# Patient Record
Sex: FEMALE | Race: WHITE | Employment: OTHER | URBAN - METROPOLITAN AREA
[De-identification: names, ages, dates, MRNs, and addresses within clinical notes are randomized per-mention and may not be internally consistent; named-entity substitution may affect disease eponyms.]

---

## 2022-10-03 ENCOUNTER — OFFICE VISIT (OUTPATIENT)
Dept: INTERNAL MEDICINE | Age: 72
End: 2022-10-03
Payer: COMMERCIAL

## 2022-10-03 VITALS
BODY MASS INDEX: 44.42 KG/M2 | DIASTOLIC BLOOD PRESSURE: 82 MMHG | WEIGHT: 241.4 LBS | HEART RATE: 51 BPM | RESPIRATION RATE: 16 BRPM | HEIGHT: 62 IN | SYSTOLIC BLOOD PRESSURE: 127 MMHG | OXYGEN SATURATION: 95 %

## 2022-10-03 DIAGNOSIS — D69.6 THROMBOCYTOPENIA (HCC): ICD-10-CM

## 2022-10-03 DIAGNOSIS — N18.30 STAGE 3 CHRONIC KIDNEY DISEASE, UNSPECIFIED WHETHER STAGE 3A OR 3B CKD (HCC): ICD-10-CM

## 2022-10-03 DIAGNOSIS — E11.42 TYPE 2 DIABETES MELLITUS WITH DIABETIC POLYNEUROPATHY, WITHOUT LONG-TERM CURRENT USE OF INSULIN (HCC): ICD-10-CM

## 2022-10-03 DIAGNOSIS — E89.0 POST-SURGICAL HYPOTHYROIDISM: ICD-10-CM

## 2022-10-03 DIAGNOSIS — I10 ESSENTIAL HYPERTENSION: ICD-10-CM

## 2022-10-03 DIAGNOSIS — Z11.59 NEED FOR HEPATITIS C SCREENING TEST: ICD-10-CM

## 2022-10-03 DIAGNOSIS — E11.42 TYPE 2 DIABETES MELLITUS WITH DIABETIC POLYNEUROPATHY, WITHOUT LONG-TERM CURRENT USE OF INSULIN (HCC): Primary | ICD-10-CM

## 2022-10-03 DIAGNOSIS — I44.0 FIRST DEGREE HEART BLOCK: ICD-10-CM

## 2022-10-03 DIAGNOSIS — R00.1 BRADYCARDIA: ICD-10-CM

## 2022-10-03 DIAGNOSIS — E66.01 CLASS 3 SEVERE OBESITY DUE TO EXCESS CALORIES WITH SERIOUS COMORBIDITY AND BODY MASS INDEX (BMI) OF 40.0 TO 44.9 IN ADULT (HCC): ICD-10-CM

## 2022-10-03 PROBLEM — K43.9 VENTRAL HERNIA: Status: RESOLVED | Noted: 2022-10-03 | Resolved: 2022-10-03

## 2022-10-03 PROBLEM — J30.9 ALLERGIC RHINITIS: Status: ACTIVE | Noted: 2022-10-03

## 2022-10-03 PROBLEM — K58.2 IRRITABLE BOWEL SYNDROME WITH BOTH CONSTIPATION AND DIARRHEA: Status: ACTIVE | Noted: 2017-01-25

## 2022-10-03 PROBLEM — K21.9 ESOPHAGEAL REFLUX: Status: ACTIVE | Noted: 2022-10-03

## 2022-10-03 PROBLEM — K43.9 VENTRAL HERNIA: Status: ACTIVE | Noted: 2022-10-03

## 2022-10-03 LAB
ALBUMIN SERPL-MCNC: 4.4 G/DL (ref 3.5–4.6)
ALP BLD-CCNC: 71 U/L (ref 40–130)
ALT SERPL-CCNC: 44 U/L (ref 0–33)
ANION GAP SERPL CALCULATED.3IONS-SCNC: 11 MEQ/L (ref 9–15)
AST SERPL-CCNC: 43 U/L (ref 0–35)
BASOPHILS ABSOLUTE: 0.1 K/UL (ref 0–0.2)
BASOPHILS RELATIVE PERCENT: 1.2 %
BILIRUB SERPL-MCNC: 0.3 MG/DL (ref 0.2–0.7)
BUN BLDV-MCNC: 25 MG/DL (ref 8–23)
CALCIUM SERPL-MCNC: 9 MG/DL (ref 8.5–9.9)
CHLORIDE BLD-SCNC: 101 MEQ/L (ref 95–107)
CHOLESTEROL, FASTING: 161 MG/DL (ref 0–199)
CO2: 26 MEQ/L (ref 20–31)
CREAT SERPL-MCNC: 1.04 MG/DL (ref 0.5–0.9)
EOSINOPHILS ABSOLUTE: 0.1 K/UL (ref 0–0.7)
EOSINOPHILS RELATIVE PERCENT: 1.6 %
GFR AFRICAN AMERICAN: >60
GFR NON-AFRICAN AMERICAN: 51.7
GLOBULIN: 3.7 G/DL (ref 2.3–3.5)
GLUCOSE FASTING: 138 MG/DL (ref 70–99)
HBA1C MFR BLD: 6.8 %
HCT VFR BLD CALC: 37.7 % (ref 37–47)
HDLC SERPL-MCNC: 51 MG/DL (ref 40–59)
HEMOGLOBIN: 12.7 G/DL (ref 12–16)
LDL CHOLESTEROL CALCULATED: 91 MG/DL (ref 0–129)
LYMPHOCYTES ABSOLUTE: 1.2 K/UL (ref 1–4.8)
LYMPHOCYTES RELATIVE PERCENT: 25.3 %
MCH RBC QN AUTO: 30.1 PG (ref 27–31.3)
MCHC RBC AUTO-ENTMCNC: 33.8 % (ref 33–37)
MCV RBC AUTO: 89.2 FL (ref 82–100)
MONOCYTES ABSOLUTE: 0.4 K/UL (ref 0.2–0.8)
MONOCYTES RELATIVE PERCENT: 7.3 %
NEUTROPHILS ABSOLUTE: 3.2 K/UL (ref 1.4–6.5)
NEUTROPHILS RELATIVE PERCENT: 64.6 %
PDW BLD-RTO: 17.6 % (ref 11.5–14.5)
PLATELET # BLD: 127 K/UL (ref 130–400)
POTASSIUM SERPL-SCNC: 4.5 MEQ/L (ref 3.4–4.9)
RBC # BLD: 4.22 M/UL (ref 4.2–5.4)
SODIUM BLD-SCNC: 138 MEQ/L (ref 135–144)
T4 FREE: 0.96 NG/DL (ref 0.84–1.68)
TOTAL PROTEIN: 8.1 G/DL (ref 6.3–8)
TRIGLYCERIDE, FASTING: 94 MG/DL (ref 0–150)
TSH REFLEX: 27.15 UIU/ML (ref 0.44–3.86)
WBC # BLD: 4.9 K/UL (ref 4.8–10.8)

## 2022-10-03 PROCEDURE — 1123F ACP DISCUSS/DSCN MKR DOCD: CPT | Performed by: NURSE PRACTITIONER

## 2022-10-03 PROCEDURE — 99204 OFFICE O/P NEW MOD 45 MIN: CPT | Performed by: NURSE PRACTITIONER

## 2022-10-03 PROCEDURE — 83036 HEMOGLOBIN GLYCOSYLATED A1C: CPT | Performed by: NURSE PRACTITIONER

## 2022-10-03 PROCEDURE — 3044F HG A1C LEVEL LT 7.0%: CPT | Performed by: NURSE PRACTITIONER

## 2022-10-03 PROCEDURE — 93000 ELECTROCARDIOGRAM COMPLETE: CPT | Performed by: NURSE PRACTITIONER

## 2022-10-03 RX ORDER — OMEGA-3 FATTY ACIDS/FISH OIL 300-1000MG
CAPSULE ORAL
COMMUNITY

## 2022-10-03 RX ORDER — LEVOTHYROXINE SODIUM 0.15 MG/1
150 TABLET ORAL DAILY
Qty: 90 TABLET | Refills: 1 | Status: SHIPPED | OUTPATIENT
Start: 2022-10-03

## 2022-10-03 RX ORDER — BIOTIN 1 MG
TABLET ORAL
COMMUNITY

## 2022-10-03 RX ORDER — LANOLIN ALCOHOL/MO/W.PET/CERES
3 CREAM (GRAM) TOPICAL DAILY
COMMUNITY

## 2022-10-03 RX ORDER — LEVOTHYROXINE SODIUM 0.15 MG/1
150 TABLET ORAL DAILY
COMMUNITY
Start: 2021-06-29 | End: 2022-10-03 | Stop reason: SDUPTHER

## 2022-10-03 RX ORDER — MULTIVITAMIN WITH IRON
250 TABLET ORAL DAILY
COMMUNITY

## 2022-10-03 RX ORDER — DOCUSATE SODIUM 100 MG/1
100 CAPSULE, LIQUID FILLED ORAL 2 TIMES DAILY
COMMUNITY

## 2022-10-03 RX ORDER — NAPROXEN 250 MG/1
250 TABLET ORAL 2 TIMES DAILY WITH MEALS
COMMUNITY

## 2022-10-03 RX ORDER — VITAMIN B COMPLEX
TABLET ORAL
COMMUNITY

## 2022-10-03 SDOH — ECONOMIC STABILITY: FOOD INSECURITY: WITHIN THE PAST 12 MONTHS, THE FOOD YOU BOUGHT JUST DIDN'T LAST AND YOU DIDN'T HAVE MONEY TO GET MORE.: NEVER TRUE

## 2022-10-03 SDOH — ECONOMIC STABILITY: FOOD INSECURITY: WITHIN THE PAST 12 MONTHS, YOU WORRIED THAT YOUR FOOD WOULD RUN OUT BEFORE YOU GOT MONEY TO BUY MORE.: NEVER TRUE

## 2022-10-03 ASSESSMENT — PATIENT HEALTH QUESTIONNAIRE - PHQ9
SUM OF ALL RESPONSES TO PHQ QUESTIONS 1-9: 8
8. MOVING OR SPEAKING SO SLOWLY THAT OTHER PEOPLE COULD HAVE NOTICED. OR THE OPPOSITE, BEING SO FIGETY OR RESTLESS THAT YOU HAVE BEEN MOVING AROUND A LOT MORE THAN USUAL: 0
4. FEELING TIRED OR HAVING LITTLE ENERGY: 3
6. FEELING BAD ABOUT YOURSELF - OR THAT YOU ARE A FAILURE OR HAVE LET YOURSELF OR YOUR FAMILY DOWN: 1
7. TROUBLE CONCENTRATING ON THINGS, SUCH AS READING THE NEWSPAPER OR WATCHING TELEVISION: 1
SUM OF ALL RESPONSES TO PHQ9 QUESTIONS 1 & 2: 3
SUM OF ALL RESPONSES TO PHQ QUESTIONS 1-9: 8
3. TROUBLE FALLING OR STAYING ASLEEP: 0
SUM OF ALL RESPONSES TO PHQ QUESTIONS 1-9: 8
SUM OF ALL RESPONSES TO PHQ QUESTIONS 1-9: 8
5. POOR APPETITE OR OVEREATING: 0
1. LITTLE INTEREST OR PLEASURE IN DOING THINGS: 0
10. IF YOU CHECKED OFF ANY PROBLEMS, HOW DIFFICULT HAVE THESE PROBLEMS MADE IT FOR YOU TO DO YOUR WORK, TAKE CARE OF THINGS AT HOME, OR GET ALONG WITH OTHER PEOPLE: 0
2. FEELING DOWN, DEPRESSED OR HOPELESS: 3
9. THOUGHTS THAT YOU WOULD BE BETTER OFF DEAD, OR OF HURTING YOURSELF: 0

## 2022-10-03 ASSESSMENT — ENCOUNTER SYMPTOMS
BLOOD IN STOOL: 0
COLOR CHANGE: 0
COUGH: 0
ABDOMINAL PAIN: 0
WHEEZING: 0
SHORTNESS OF BREATH: 0
RESPIRATORY NEGATIVE: 1

## 2022-10-03 ASSESSMENT — SOCIAL DETERMINANTS OF HEALTH (SDOH): HOW HARD IS IT FOR YOU TO PAY FOR THE VERY BASICS LIKE FOOD, HOUSING, MEDICAL CARE, AND HEATING?: NOT HARD AT ALL

## 2022-10-03 NOTE — PROGRESS NOTES
[address] [facility] [address] [facility] PRIMARY CARE  [address] [facility] [address]  Dept: [phone]  Dept Fax: [phone]: [phone]     CORBY Ge (: 1950) is a 74 y.o. female, New patient, here for evaluation of the following chief complaint(s):  New Patient (Diabetic. No longer taking Metformin. Prefers to take a holistic approach to medicine and health.) and Bradycardia (Very low pulse. Sometimes is under 40.)      PCP:  RASHIDA Cuadra CNP      Pt here to [facility]. Formerly seen at [facility]. She has noted that her pulse has been very low for the last 9 months. Has not mentioned or been evaluated by her prev pcp for it. Has been below 40 a couple of times. She doesn't feel short of breath and dizziness or chest pain. Has never had heart issues in past.     Was on metformin since , lasix and lisinopril. Went off everything in March. Started holistic supplements. Her bp was 140-150s/60-70. Has not been higher than 7% that she can remember. Has been in stage 3 kidney disease for years. Just restarted her levothyroxine  after stopping since March d/t having multiple symptoms. Was taking supplement in its place. Review of Systems   Constitutional:  Positive for fatigue. Negative for unexpected weight change. Eyes:  Negative for visual disturbance. Respiratory: Negative. Negative for cough, shortness of breath and wheezing. Cardiovascular: Negative. Negative for chest pain, palpitations and leg swelling. Slow heart rate, below 40 on occasion   Gastrointestinal:  Negative for abdominal pain and blood in stool. Endocrine: Negative for polydipsia, polyphagia and polyuria. Skin:  Negative for color change. Neurological:  Positive for numbness. Hematological:  Negative for adenopathy. Bruises/bleeds easily. Psychiatric/Behavioral: Negative. Negative for dysphoric mood.  The patient is not nervous/anxious.       Past Medical History:   Diagnosis Date    Diabetes ([address]     Hypertension     Kidney disease     Osteoarthritis     Ventral hernia 10/3/2022    Formatting of this note might be different from the original. dx 2001 updated for 10/16 reg imo load    Water retention      Past Surgical History:   Procedure Laterality Date    CHOLECYSTECTOMY N/A     THYROIDECTOMY N/A     d/t numerous nodules bilat    TUBAL LIGATION      TUMOR REMOVAL Left     Left ovarian tumor     Social History     Socioeconomic History    Marital status:      Spouse name: Not on file    Number of children: Not on file    Years of education: Not on file    Highest education level: Not on file   Occupational History    Not on file   Tobacco Use    Smoking status: Former     Packs/day: 1.00     Years: 40.00     Pack years: 40.00     Types: Cigarettes     Start date:      Quit date:      Years since quittin.7    Smokeless tobacco: Never   Substance and Sexual Activity    Alcohol use: Not Currently    Drug use: Never    Sexual activity: Not on file   Other Topics Concern    Not on file   Social History Narrative    Not on file     Social Determinants of Health     Financial Resource Strain: Low Risk     Difficulty of Paying Living Expenses: Not hard at all   Food Insecurity: No Food Insecurity    Worried About Running Out of Food in the Last Year: Never true    Ran Out of Food in the Last Year: Never true   Transportation Needs: Not on file   Physical Activity: Not on file   Stress: Not on file   Social Connections: Not on file   Intimate Partner Violence: Not on file   Housing Stability: Not on file     Family History   Problem Relation Age of Onset    Multinodular goiter Mother     Hypertension Father       Allergies   Allergen Reactions    Molds & Smuts Cough    Moxifloxacin Other (See Comments)     dizziness    Terbutaline Sulfate      palpitations, diff breathing  tachycardia      Tetanus Antitoxin      red , hard and hot at site    Theophylline Other (See Comments)    Thimerosal Itching    Morphine And Related Nausea And Vomiting     vomiting,dizziness  Dizziness  Excessive n/v       Prior to Admission medications    Medication Sig Start Date End Date Taking? Authorizing Provider   Barberry-Oreg Grape-Goldenseal (BERBERINE COMPLEX PO) Take by mouth 500mg twice a day   Yes Historical Provider, MD   Coenzyme Q10 (COQ10) 100 MG CAPS Take by mouth Take one oral daily.    Yes Historical Provider, MD   Milk Thistle 1000 MG CAPS Take by mouth Take one oral one a day   Yes Historical Provider, MD   Omega 3 1000 MG CAPS Take by mouth Take one oral once a day   Yes Historical Provider, MD   Cholecalciferol (VITAMIN D3) 125 MCG (5000 UT) TABS Take by mouth Take one oral once a day   Yes Historical Provider, MD   Biotin 1000 MCG TABS Take by mouth Take one oral once a day   Yes Historical Provider, MD   zinc 50 MG CAPS Take by mouth Take one oral once a day   Yes Historical Provider, MD   melatonin 3 MG TABS tablet Take 3 mg by mouth daily Take one oral at bedtime   Yes Historical Provider, MD   docusate sodium (COLACE) 100 MG capsule Take 100 mg by mouth 2 times daily Take one oral twice a day   Yes Historical Provider, MD   B Complex-C-Folic Acid (HM SUPER VITAMIN B COMPLEX/C PO) Take by mouth Take one oral one a day   Yes Historical Provider, MD   magnesium (MAGNESIUM-OXIDE) 250 MG TABS tablet Take 250 mg by mouth daily Take one oral one a day   Yes Historical Provider, MD   naproxen (NAPROSYN) 250 MG tablet Take 250 mg by mouth 2 times daily (with meals) PRN   Yes Historical Provider, MD Richards Canny Tea, Laly sinensis, (GREEN TEA PO) Take by mouth Tea   Yes Historical Provider, MD   DANDELION PO Take by mouth tea   Yes Historical Provider, MD   levothyroxine (SYNTHROID) 150 MCG tablet Take 1 tablet by mouth daily 10/3/22  Yes Todd Donohue APRN - CNP                I have reviewed the patient's medical history in detail and updated the computerized patient record. OBJECTIVE    Vitals:    10/03/22 0832   BP: 127/82   Site: Left Lower Arm   Position: Sitting   Cuff Size: Medium Adult   Pulse: 51   Resp: 16   SpO2: 95%   Weight: 241 lb 6.4 oz (109.5 kg)   Height: 5' 2\" (1.575 m)       Physical Exam  Vitals and nursing note reviewed. Constitutional:       General: She is not in acute distress. Appearance: Normal appearance. She is obese. Cardiovascular:      Rate and Rhythm: Regular rhythm. Bradycardia present. Pulses:           Dorsalis pedis pulses are 2+ on the right side and 2+ on the left side. Heart sounds: Normal heart sounds. Pulmonary:      Effort: Pulmonary effort is normal. No respiratory distress. Breath sounds: Normal breath sounds. Musculoskeletal:      Cervical back: Normal range of motion and neck supple. Feet:      Right foot:      Protective Sensation: 10 sites tested. 10 sites sensed. Skin integrity: Skin integrity normal.      Toenail Condition: Right toenails are normal.      Left foot:      Protective Sensation: 10 sites tested. 10 sites sensed. Skin integrity: Skin integrity normal.      Toenail Condition: Left toenails are normal.      Comments: Decreased sensation to bilat distal half of foot and toes, but can feel all points. Bruise noted to 2nd toe on right foot, no skin breakdown. Lymphadenopathy:      Cervical: No cervical adenopathy. Skin:     General: Skin is warm and dry. Neurological:      Mental Status: She is alert and oriented to person, place, and time. Psychiatric:         Mood and Affect: Mood normal.         Thought Content: Thought content normal.         ASSESSMENT/ PLAN    1.  Type 2 diabetes mellitus with diabetic polyneuropathy, without long-term current use of insulin (HCC)  Chronic, stable at 6.8%, was 6.9% a yr ago.   -took self off metformin (1g BID) in March, doing ok  -continue ADA style/lower carb diet  -declines lisinopril (was on 10mg in past), statins, asa. Wants to do only \"holistic remedies\". She has researched on the internet. When at [facility], the holistic dr's all went to [facility] and she is not driving there and said insurance wouldn't cover this speciality.  - Comprehensive Metabolic Panel, Fasting; Future  - Lipid, Fasting; Future  -  DIABETES FOOT EXAM  - Microalbumin / Creatinine Urine Ratio; Future    2. Thrombocytopenia (HCC)  Chronic, unsure when started. Reports her prev pcp did mention, but that was it  - CBC with Auto Differential; Future    3. Stage 3 chronic kidney disease, unspecified whether stage 3a or 3b CKD (HCC)  Chronic, check labs  -was on lisinopril 10mg and lasix 20mg for past 30+ yrs which she feels is cause. Educated on natural progression of ckd with dm, htn, and age. Will see where gfr is  - Comprehensive Metabolic Panel, Fasting; Future    4. Class 3 severe obesity due to excess calories with serious comorbidity and body mass index (BMI) of 40.0 to 44.9 in [facility])  Chronic  - The standard range for ages 25 and older is >=18.5 and < 25 kg/m2. Your BMI today was above this range, this falls in the overweight obesity morbid obese category and there are medical benefits to weight loss. BMI monitoring is helful with identifying a weight problem that may be related to a medical condition, or may increase the risk for medical problems. Your BMI and weight management will be followed at subsequent visits with your provider and monitored for progress. GOAL; promoting lifestyle and diet changes in order to reach a healthy weight. 5. Bradycardia/ First degree heart block  Chronic, new issue. asymptomatic  - EKG 12 Lead - Clinic Performed; Future, 1st degree AV block which is new per pt  -refer to cardio  in [facility]  as no reason for her to have such low hr or block.  Discussed I am not sure what all the interactions with her herbals are and that could be contributing, she is adamant did research online and was not listed as a side effect. - reports had cardiac cath around 2008 for chest pain and was wnl    6. Essential hypertension  Chronic, stable off lisinopril since March d/t wants to take supplements only  -advised she search for a holistic dr if chooses to pursue as I am not versed in the herbal remedies and do not support this type of med. However, at this time her bp is stable so feel she is ok  - Comprehensive Metabolic Panel, Fasting; Future    7. Post-surgical hypothyroidism  Chronic, just restarted her levothyroxine 1 week ago after stopping in March d/t taking an herbal remedy  - TSH with Reflex; Future    8. Need for hepatitis C screening test  - Hepatitis C Antibody; Future        Return in about 6 months (around 4/3/2023) for dm recheck.      Electronically signed by:  RASHIDA Hernandez - SEBASTIÁN   10/3/22

## 2022-10-04 LAB
CREATININE URINE: 59.9 MG/DL
HEPATITIS C ANTIBODY: NONREACTIVE
MICROALBUMIN UR-MCNC: 28.1 MG/DL
MICROALBUMIN/CREAT UR-RTO: 469.1 MG/G (ref 0–30)

## 2022-10-05 NOTE — RESULT ENCOUNTER NOTE
Thyroid level was abnormal as we expected since was off her med. Cholesterol is mildly elevated. Kidney function shows still in chronic kidney disease stage 3a. Urine test did show is passing more protein thru her kidneys than should, I do suggest that she start that new med Xin but know she is doing holistic options with supplements. So, if changes her mind or gets worse in future, can consider then.

## 2023-01-19 ENCOUNTER — TELEPHONE (OUTPATIENT)
Dept: INTERNAL MEDICINE | Age: 73
End: 2023-01-19

## 2023-01-19 DIAGNOSIS — Z12.31 ENCOUNTER FOR SCREENING MAMMOGRAM FOR MALIGNANT NEOPLASM OF BREAST: Primary | ICD-10-CM

## 2023-01-19 NOTE — LETTER
[facility] Primary Care  [address] [address]  Phone: [phone]  Fax: [phone] Seven Ferrell, APRN - CNP        January 19, 2023    Johanna Thayer  [address] [place] [id number]      Dear Arlette Gosselin:    Our records indicate that you may be overdue for your Mammogram. If you have already had your mammogram in the past 24 months, great job! Please send a [employer] message or call our office with when (month and year) and where your mammogram was completed. This will allow us to obtain a copy of your results and update your medical records. I have placed a Mammogram order on your behalf. The [facility] will be calling you to schedule, or you may take advantage of walk-in mammogram locations for a date and time that is convenient for you. Please see attached flyer for information on locations near you. If you have any questions or concerns, please don't hesitate to call.     Sincerely,        Jm Montoya MA

## 2023-02-17 DIAGNOSIS — E89.0 POST-SURGICAL HYPOTHYROIDISM: ICD-10-CM

## 2023-02-17 RX ORDER — LEVOTHYROXINE SODIUM 0.15 MG/1
150 TABLET ORAL DAILY
Qty: 90 TABLET | Refills: 0 | Status: SHIPPED | OUTPATIENT
Start: 2023-02-17

## 2023-02-17 NOTE — TELEPHONE ENCOUNTER
Comments:     Last Office Visit (last PCP visit):   10/3/2022    Next Visit Date:  Future Appointments   Date Time Provider Shaye Sibley   4/3/2023  8:00 AM Fatoumata Sheets, [address]       **If hasn't been seen in over a year OR hasn't followed up according to last diabetes/ADHD visit, make appointment for patient before sending refill to provider.     Rx requested:  Requested Prescriptions     Pending Prescriptions Disp Refills    levothyroxine (SYNTHROID) 150 MCG tablet 90 tablet 1     Sig: Take 1 tablet by mouth daily

## 2023-02-17 NOTE — TELEPHONE ENCOUNTER
Pt called requesting a refill on SYNTHROID, 150 MCG tablet, please. This is her new pharmacy. [email or web address].  She is requesting the 90 day refill, please    Thank you

## 2023-04-03 ENCOUNTER — OFFICE VISIT (OUTPATIENT)
Dept: INTERNAL MEDICINE | Age: 73
End: 2023-04-03
Payer: COMMERCIAL

## 2023-04-03 VITALS
HEART RATE: 49 BPM | SYSTOLIC BLOOD PRESSURE: 158 MMHG | HEIGHT: 62 IN | RESPIRATION RATE: 16 BRPM | BODY MASS INDEX: 42.91 KG/M2 | OXYGEN SATURATION: 98 % | DIASTOLIC BLOOD PRESSURE: 70 MMHG | WEIGHT: 233.2 LBS

## 2023-04-03 DIAGNOSIS — R00.1 BRADYCARDIA: ICD-10-CM

## 2023-04-03 DIAGNOSIS — R80.9 MICROALBUMINURIA: ICD-10-CM

## 2023-04-03 DIAGNOSIS — I10 ESSENTIAL HYPERTENSION: ICD-10-CM

## 2023-04-03 DIAGNOSIS — N18.31 STAGE 3A CHRONIC KIDNEY DISEASE (HCC): ICD-10-CM

## 2023-04-03 DIAGNOSIS — E89.0 POST-SURGICAL HYPOTHYROIDISM: ICD-10-CM

## 2023-04-03 DIAGNOSIS — D69.6 THROMBOCYTOPENIA (HCC): ICD-10-CM

## 2023-04-03 DIAGNOSIS — E66.01 CLASS 3 SEVERE OBESITY DUE TO EXCESS CALORIES WITH SERIOUS COMORBIDITY AND BODY MASS INDEX (BMI) OF 40.0 TO 44.9 IN ADULT (HCC): ICD-10-CM

## 2023-04-03 DIAGNOSIS — E11.42 TYPE 2 DIABETES MELLITUS WITH DIABETIC POLYNEUROPATHY, WITHOUT LONG-TERM CURRENT USE OF INSULIN (HCC): Primary | ICD-10-CM

## 2023-04-03 DIAGNOSIS — E11.42 TYPE 2 DIABETES MELLITUS WITH DIABETIC POLYNEUROPATHY, WITHOUT LONG-TERM CURRENT USE OF INSULIN (HCC): ICD-10-CM

## 2023-04-03 LAB
ANION GAP SERPL CALCULATED.3IONS-SCNC: 12 MEQ/L (ref 9–15)
BASOPHILS # BLD: 0.1 K/UL (ref 0–0.2)
BASOPHILS NFR BLD: 1.4 %
BUN SERPL-MCNC: 13 MG/DL (ref 8–23)
CALCIUM SERPL-MCNC: 8.7 MG/DL (ref 8.5–9.9)
CHLORIDE SERPL-SCNC: 102 MEQ/L (ref 95–107)
CO2 SERPL-SCNC: 26 MEQ/L (ref 20–31)
CREAT SERPL-MCNC: 0.9 MG/DL (ref 0.5–0.9)
EOSINOPHIL # BLD: 0.1 K/UL (ref 0–0.7)
EOSINOPHIL NFR BLD: 1.7 %
ERYTHROCYTE [DISTWIDTH] IN BLOOD BY AUTOMATED COUNT: 15.5 % (ref 11.5–14.5)
GLUCOSE SERPL-MCNC: 139 MG/DL (ref 70–99)
HBA1C MFR BLD: 7.1 % (ref 4.8–5.9)
HCT VFR BLD AUTO: 37.3 % (ref 37–47)
HGB BLD-MCNC: 12.3 G/DL (ref 12–16)
LYMPHOCYTES # BLD: 1 K/UL (ref 1–4.8)
LYMPHOCYTES NFR BLD: 23.7 %
MCH RBC QN AUTO: 29 PG (ref 27–31.3)
MCHC RBC AUTO-ENTMCNC: 33 % (ref 33–37)
MCV RBC AUTO: 88 FL (ref 79.4–94.8)
MONOCYTES # BLD: 0.4 K/UL (ref 0.2–0.8)
MONOCYTES NFR BLD: 8.7 %
NEUTROPHILS # BLD: 2.6 K/UL (ref 1.4–6.5)
NEUTS SEG NFR BLD: 64.5 %
PLATELET # BLD AUTO: 107 K/UL (ref 130–400)
POTASSIUM SERPL-SCNC: 4.6 MEQ/L (ref 3.4–4.9)
RBC # BLD AUTO: 4.24 M/UL (ref 4.2–5.4)
SODIUM SERPL-SCNC: 140 MEQ/L (ref 135–144)
TSH REFLEX: 3.04 UIU/ML (ref 0.44–3.86)
WBC # BLD AUTO: 4 K/UL (ref 4.8–10.8)

## 2023-04-03 PROCEDURE — 1123F ACP DISCUSS/DSCN MKR DOCD: CPT | Performed by: NURSE PRACTITIONER

## 2023-04-03 PROCEDURE — 3078F DIAST BP <80 MM HG: CPT | Performed by: NURSE PRACTITIONER

## 2023-04-03 PROCEDURE — 99214 OFFICE O/P EST MOD 30 MIN: CPT | Performed by: NURSE PRACTITIONER

## 2023-04-03 PROCEDURE — 3077F SYST BP >= 140 MM HG: CPT | Performed by: NURSE PRACTITIONER

## 2023-04-03 SDOH — ECONOMIC STABILITY: FOOD INSECURITY: WITHIN THE PAST 12 MONTHS, YOU WORRIED THAT YOUR FOOD WOULD RUN OUT BEFORE YOU GOT MONEY TO BUY MORE.: NEVER TRUE

## 2023-04-03 SDOH — ECONOMIC STABILITY: HOUSING INSECURITY
IN THE LAST 12 MONTHS, WAS THERE A TIME WHEN YOU DID NOT HAVE A STEADY PLACE TO SLEEP OR SLEPT IN A SHELTER (INCLUDING NOW)?: NO

## 2023-04-03 SDOH — ECONOMIC STABILITY: INCOME INSECURITY: HOW HARD IS IT FOR YOU TO PAY FOR THE VERY BASICS LIKE FOOD, HOUSING, MEDICAL CARE, AND HEATING?: NOT HARD AT ALL

## 2023-04-03 SDOH — ECONOMIC STABILITY: FOOD INSECURITY: WITHIN THE PAST 12 MONTHS, THE FOOD YOU BOUGHT JUST DIDN'T LAST AND YOU DIDN'T HAVE MONEY TO GET MORE.: NEVER TRUE

## 2023-04-03 ASSESSMENT — PATIENT HEALTH QUESTIONNAIRE - PHQ9
SUM OF ALL RESPONSES TO PHQ QUESTIONS 1-9: 0
SUM OF ALL RESPONSES TO PHQ9 QUESTIONS 1 & 2: 0
1. LITTLE INTEREST OR PLEASURE IN DOING THINGS: 0
SUM OF ALL RESPONSES TO PHQ QUESTIONS 1-9: 0
2. FEELING DOWN, DEPRESSED OR HOPELESS: 0
SUM OF ALL RESPONSES TO PHQ QUESTIONS 1-9: 0
SUM OF ALL RESPONSES TO PHQ QUESTIONS 1-9: 0

## 2023-04-03 ASSESSMENT — ENCOUNTER SYMPTOMS
COUGH: 0
CHEST TIGHTNESS: 0
SHORTNESS OF BREATH: 0
RESPIRATORY NEGATIVE: 1
RHINORRHEA: 0
TROUBLE SWALLOWING: 0
SORE THROAT: 0
BLOOD IN STOOL: 0
WHEEZING: 0
COLOR CHANGE: 0

## 2023-04-03 NOTE — PROGRESS NOTES
[address] PRIMARY CARE  [address] [facility] [address]  Dept: [phone]  Dept Fax: [phone]: [phone]     CORBY Winchester (: 1950) is a 74 y.o. female, Established patient, here for evaluation of the following chief complaint(s):  Diabetes (6 month diabetic follow up. Patient reports CRICKET phenomena. Patient tests her blood sugar 1-2 times a day. )      PCP:  RASHIDA Maharaj CNP      Pt here today for 6 month checkup. She only does holistic herbal remedies, took self off all her meds. Doesn't believe in big pharma. Relies on things she reads on internet. Feels metformin and lisinopril caused her kidney damage and bp issues. Patient is here for follow up on Diabetes. How often are you checking your blood sugars? 1 times per day   What are your average readings? 122-160  Do you have low blood sugar readings/symptoms? no  Do you have high blood sugar readings/symptoms? no  Are you compliant with your diet? yes  Do you exercise? no  Are you compliant with your medications? No took self off all meds  Are you having difficulty affording your medications? no  Do you have any of the following symptoms? Vision problems? no  GI-Nausea/committing/bloating? no  Lightheadedness? no  Paresthesias, Ulcerations or sores? No    Diabetic Exams up to date? Last a1c? Lab Results       Component                Value               Date                       LABA1C                   6.8                 10/03/2022            No results found for: EAG   Diabetic foot exam: done 10/22  Urine Microalbumin: Lab Results       Component                Value               Date                       LABMICR                  28.10 (H)           10/03/2022             Last diabetic eye exam: up to date.  Requesting records        Hypothyroidism: She has been taking it consistently since October after taking self

## 2023-04-05 ENCOUNTER — TELEPHONE (OUTPATIENT)
Dept: INTERNAL MEDICINE | Age: 73
End: 2023-04-05

## 2023-04-05 DIAGNOSIS — E89.0 POST-SURGICAL HYPOTHYROIDISM: ICD-10-CM

## 2023-04-05 RX ORDER — LEVOTHYROXINE SODIUM 0.15 MG/1
150 TABLET ORAL DAILY
Qty: 90 TABLET | Refills: 3 | Status: CANCELLED | OUTPATIENT
Start: 2023-04-05

## 2023-04-17 LAB — DIABETIC RETINOPATHY: NEGATIVE

## 2023-04-20 ENCOUNTER — TELEPHONE (OUTPATIENT)
Dept: INTERNAL MEDICINE | Age: 73
End: 2023-04-20